# Patient Record
Sex: FEMALE | Race: WHITE | Employment: PART TIME | URBAN - NONMETROPOLITAN AREA
[De-identification: names, ages, dates, MRNs, and addresses within clinical notes are randomized per-mention and may not be internally consistent; named-entity substitution may affect disease eponyms.]

---

## 2017-11-20 ENCOUNTER — HOSPITAL ENCOUNTER (OUTPATIENT)
Dept: WOMENS IMAGING | Age: 52
Discharge: HOME | End: 2017-11-20

## 2017-11-20 DIAGNOSIS — Z12.31 VISIT FOR SCREENING MAMMOGRAM: ICD-10-CM

## 2017-11-20 PROCEDURE — G0202 SCR MAMMO BI INCL CAD: HCPCS

## 2018-11-26 ENCOUNTER — HOSPITAL ENCOUNTER (OUTPATIENT)
Dept: MAMMOGRAPHY | Age: 53
Discharge: HOME | End: 2018-11-26

## 2018-11-26 DIAGNOSIS — Z12.39 SCREENING BREAST EXAMINATION: ICD-10-CM

## 2018-11-26 PROCEDURE — 77063 BREAST TOMOSYNTHESIS BI: CPT

## 2019-08-22 ENCOUNTER — HOSPITAL ENCOUNTER (OUTPATIENT)
Dept: GENERAL RADIOLOGY | Age: 54
Discharge: HOME | End: 2019-08-22

## 2019-08-22 ENCOUNTER — HOSPITAL ENCOUNTER (OUTPATIENT)
Age: 54
Discharge: HOME | End: 2019-08-22

## 2019-08-22 DIAGNOSIS — S80.02XA CONTUSION OF LEFT KNEE, INITIAL ENCOUNTER: ICD-10-CM
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
## 2019-08-22 DIAGNOSIS — M17.32 UNILATERAL POST-TRAUMATIC OSTEOARTHRITIS, LEFT KNEE: ICD-10-CM
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
## 2019-08-22 PROCEDURE — 73560 X-RAY EXAM OF KNEE 1 OR 2: CPT

## 2019-12-04 ENCOUNTER — HOSPITAL ENCOUNTER (OUTPATIENT)
Dept: MAMMOGRAPHY | Age: 54
Discharge: HOME | End: 2019-12-04
Payer: COMMERCIAL

## 2019-12-04 DIAGNOSIS — Z12.39 BREAST SCREENING: ICD-10-CM

## 2019-12-04 PROCEDURE — 77063 BREAST TOMOSYNTHESIS BI: CPT

## 2020-08-31 ENCOUNTER — HOSPITAL ENCOUNTER (OUTPATIENT)
Dept: CT IMAGING | Age: 55
Discharge: HOME | End: 2020-08-31

## 2020-08-31 ENCOUNTER — HOSPITAL ENCOUNTER (OUTPATIENT)
Age: 55
Discharge: HOME | End: 2020-08-31

## 2020-08-31 LAB
CREATININE, WHOLE BLOOD: 0.6 MG/DL (ref 0.5–1.2)
ESTIMATED GFR, PCACC: > 90 ML/MIN/1.73M2

## 2020-08-31 PROCEDURE — 74177 CT ABD & PELVIS W/CONTRAST: CPT

## 2020-08-31 PROCEDURE — 6360000004 HC RX CONTRAST MEDICATION: Performed by: FAMILY MEDICINE

## 2020-08-31 PROCEDURE — 82565 ASSAY OF CREATININE: CPT

## 2020-08-31 RX ADMIN — IOPAMIDOL 100 ML: 755 INJECTION, SOLUTION INTRAVENOUS at 15:53

## 2020-08-31 RX ADMIN — IOHEXOL 50 ML: 240 INJECTION, SOLUTION INTRATHECAL; INTRAVASCULAR; INTRAVENOUS; ORAL at 14:45

## 2021-01-22 ENCOUNTER — HOSPITAL ENCOUNTER (OUTPATIENT)
Dept: MAMMOGRAPHY | Age: 56
Discharge: HOME | End: 2021-01-22
Payer: COMMERCIAL

## 2021-01-22 DIAGNOSIS — Z12.39 BREAST SCREENING: ICD-10-CM

## 2021-01-22 PROCEDURE — 77063 BREAST TOMOSYNTHESIS BI: CPT

## 2022-01-24 ENCOUNTER — HOSPITAL ENCOUNTER (OUTPATIENT)
Dept: MAMMOGRAPHY | Age: 57
Discharge: HOME | End: 2022-01-24
Payer: COMMERCIAL

## 2022-01-24 DIAGNOSIS — Z12.39 SCREENING BREAST EXAMINATION: ICD-10-CM

## 2022-01-24 PROCEDURE — 77063 BREAST TOMOSYNTHESIS BI: CPT

## 2023-01-24 ENCOUNTER — HOSPITAL ENCOUNTER (OUTPATIENT)
Dept: MAMMOGRAPHY | Age: 58
Discharge: HOME | End: 2023-01-24
Payer: COMMERCIAL

## 2023-01-24 DIAGNOSIS — Z12.39 SCREENING BREAST EXAMINATION: ICD-10-CM

## 2023-01-24 PROCEDURE — 77067 SCR MAMMO BI INCL CAD: CPT

## 2023-07-28 ENCOUNTER — HOSPITAL ENCOUNTER (OUTPATIENT)
Dept: MRI IMAGING | Age: 58
Discharge: HOME | End: 2023-07-28
Attending: RADIOLOGY

## 2023-07-28 DIAGNOSIS — Z00.6 EXAMINATION FOR NORMAL COMPARISON FOR CLINICAL RESEARCH: ICD-10-CM

## 2023-08-09 ENCOUNTER — HOSPITAL ENCOUNTER (OUTPATIENT)
Age: 58
Discharge: HOME | End: 2023-08-09
Payer: MEDICAID

## 2023-08-09 ENCOUNTER — HOSPITAL ENCOUNTER (OUTPATIENT)
Dept: GENERAL RADIOLOGY | Age: 58
Discharge: HOME | End: 2023-08-09
Payer: MEDICAID

## 2023-08-09 ENCOUNTER — OFFICE VISIT (OUTPATIENT)
Dept: NEUROLOGY | Age: 58
End: 2023-08-09
Payer: MEDICAID

## 2023-08-09 VITALS
HEIGHT: 63 IN | BODY MASS INDEX: 51.91 KG/M2 | DIASTOLIC BLOOD PRESSURE: 70 MMHG | SYSTOLIC BLOOD PRESSURE: 125 MMHG | HEART RATE: 71 BPM | OXYGEN SATURATION: 97 % | WEIGHT: 293 LBS

## 2023-08-09 DIAGNOSIS — G44.89 OTHER HEADACHE SYNDROME: ICD-10-CM

## 2023-08-09 DIAGNOSIS — G44.89 OTHER HEADACHE SYNDROME: Primary | ICD-10-CM

## 2023-08-09 PROCEDURE — 72050 X-RAY EXAM NECK SPINE 4/5VWS: CPT

## 2023-08-09 PROCEDURE — 99205 OFFICE O/P NEW HI 60 MIN: CPT | Performed by: PSYCHIATRY & NEUROLOGY

## 2023-08-09 RX ORDER — FLUOXETINE HYDROCHLORIDE 20 MG/1
20 CAPSULE ORAL DAILY
COMMUNITY

## 2023-08-09 RX ORDER — LAMOTRIGINE 25 MG/1
TABLET ORAL
Qty: 120 TABLET | Refills: 1 | Status: SHIPPED | OUTPATIENT
Start: 2023-08-09

## 2023-08-09 NOTE — PATIENT INSTRUCTIONS
Sed rate  CRP  Cervical spine x-ray  Start Lamictal 25 mg daily for 2 weeks then 50 mg daily for 2 weeks then 100 mg daily thereafter. Report any rash. Call if any questions. Referral to sleep medicine to screen for underlying sleep disorder  Keep headache diary  Call with any new symptoms or concerns.    Follow up in 6 weeks with Dr. Lizzie Carmona

## 2023-08-11 ENCOUNTER — OFFICE VISIT (OUTPATIENT)
Dept: PULMONOLOGY | Age: 58
End: 2023-08-11
Payer: MEDICAID

## 2023-08-11 VITALS
DIASTOLIC BLOOD PRESSURE: 74 MMHG | HEIGHT: 63 IN | BODY MASS INDEX: 51.91 KG/M2 | HEART RATE: 82 BPM | SYSTOLIC BLOOD PRESSURE: 126 MMHG | WEIGHT: 293 LBS | OXYGEN SATURATION: 96 %

## 2023-08-11 DIAGNOSIS — G44.89 HEADACHE SYNDROME: ICD-10-CM

## 2023-08-11 DIAGNOSIS — R53.82 CHRONIC FATIGUE: ICD-10-CM

## 2023-08-11 DIAGNOSIS — E66.01 MORBID OBESITY WITH BMI OF 60.0-69.9, ADULT (HCC): ICD-10-CM

## 2023-08-11 DIAGNOSIS — R29.818 SUSPECTED SLEEP APNEA: Primary | ICD-10-CM

## 2023-08-11 PROCEDURE — 99204 OFFICE O/P NEW MOD 45 MIN: CPT | Performed by: NURSE PRACTITIONER

## 2023-08-11 ASSESSMENT — ENCOUNTER SYMPTOMS
WHEEZING: 1
COUGH: 1
EYE ITCHING: 1
SINUS PAIN: 1
BACK PAIN: 1
SHORTNESS OF BREATH: 1
ABDOMINAL DISTENTION: 1
DIARRHEA: 1
SINUS PRESSURE: 1

## 2023-08-11 NOTE — PROGRESS NOTES
60.0-69.9, adult ([address])  ECHO Complete 2D W Doppler W Color    Baseline Diagnostic Sleep Study    Ferritin    Iron Binding Capacity    CBC with Auto Differential      4.  Chronic fatigue  Ferritin    Iron Binding Capacity    CBC with Auto Differential           Plan   Given symptoms,  will going proceed with a sleep study (HST vs PSG vs Split night)  Discussed the study at length with patient  Discussed treatment options if sleep study is positive  Encouraged to work on weight loss  Encouraged to get in 7 to 9 hours of sleep  Pulmonary hygiene discussed  Follow-up pending sleep study   ECHO  PSG and RTC 1 week to follow up    Electronically signed by LEISA Lopes CNP on 8/11/2023 at 11:27 AM

## 2023-08-15 ENCOUNTER — TELEPHONE (OUTPATIENT)
Dept: NEUROLOGY | Age: 58
End: 2023-08-15

## 2023-08-15 DIAGNOSIS — R53.82 CHRONIC FATIGUE: Primary | ICD-10-CM

## 2023-08-15 DIAGNOSIS — R79.0 LOW FERRITIN LEVEL: ICD-10-CM

## 2023-08-15 RX ORDER — FERROUS SULFATE 325(65) MG
325 TABLET ORAL 2 TIMES DAILY
Qty: 180 TABLET | Refills: 1 | Status: SHIPPED | OUTPATIENT
Start: 2023-08-15

## 2023-08-15 NOTE — TELEPHONE ENCOUNTER
Patient calling stating she completed Cervical spine X-ray, results in chart. She has scheduled appointment with chiropractor tomorrow and is asking if there is anything showing on the X-ray contraindicating chiropractor manipulation. Please advise. Thank you.

## 2023-08-15 NOTE — TELEPHONE ENCOUNTER
Cervical spine X ray shows: Mild uncinate spurring C4-5 and C5-C6 bilaterally. Moderate multilevel degenerative facet arthropathy bilaterally. Mild spondylosis scattered in the cervical spine. C7 is partially obscured by the patient's shoulders. No cervical fracture seen. Slight disc space narrowing C4-5 and C5-6. Prevertebral soft tissues unremarkable. Mild foraminal encroachment C3-4 right side by a prominent facet joint spur. I recommend against chiropractic neck manipulation. We can send her to PT as alternative.      Zion Quevedo MD

## 2023-08-16 ENCOUNTER — HOSPITAL ENCOUNTER (OUTPATIENT)
Dept: NON INVASIVE DIAGNOSTICS | Age: 58
Discharge: HOME | End: 2023-08-16
Payer: MEDICAID

## 2023-08-16 DIAGNOSIS — E66.01 MORBID OBESITY WITH BMI OF 60.0-69.9, ADULT (HCC): ICD-10-CM

## 2023-08-16 DIAGNOSIS — G44.89 HEADACHE SYNDROME: ICD-10-CM

## 2023-08-16 DIAGNOSIS — R29.818 SUSPECTED SLEEP APNEA: ICD-10-CM

## 2023-08-16 LAB
LV EF: 50 %
LVEF MODALITY: NORMAL

## 2023-08-16 PROCEDURE — 93306 TTE W/DOPPLER COMPLETE: CPT

## 2023-08-16 NOTE — PROGRESS NOTES
BRCA 2 Negative Mother     Dementia Mother     Heart Disease Father     Alcohol Abuse Father     Heart Attack Father     Cancer Sister     Breast Cancer Sister 55    BRCA 1 Negative Sister         negative    BRCA 2 Negative Sister     Cancer Sister     Breast Cancer Sister 62    BRCA 1 Negative Sister         negative    BRCA 2 Negative Sister     Cancer Sister     Ovarian Cancer Sister 62         I reviewed the past medical history, allergies, medications, social history and family history. Review of Systems   All systems reviewed, and are all negative, except what is mentioned in HPI      Vitals:    08/09/23 1355   BP: 125/70   Site: Left Upper Arm   Position: Sitting   Cuff Size: Large Adult   Pulse: 71   SpO2: 97%   Weight: (!) 346 lb (156.9 kg)   Height: 5' 3\" (1.6 m)       Physical Examination:  General appearance - alert, well appearing, and in no distress, oriented to person, place, and time and over weight  Mental status- Level of Alertness: awake  Orientation: person, place, time  Memory: normal  Fund of Knowledge: normal  Attention/Concentration: normal  Language: normal. Mood is normal.   Neck - supple, no significant adenopathy, carotids upstroke normal bilaterally. There is no axillary lymphadenopathy. There is no carotid bruit. No neck lymphadenopathy. No thyroid enlargement   Neurological -   Cranial Ibgals-OX-MMG:.   Cranial nerve II: Normal   Cranial nerve III: Pupils: equal, round, reactive to light  Cranial nerves III, IV, VI: Extraocular Movements: intact   Cranial nerve V: Facial sensation: intact   Cranial nerve VII:Facial strength: intact   Cranial nerve VIII: Hearing: intact    Cranial nerve IX: Palate Elevation intact bilaterally  Cranial nerve XI: Shoulder shrug intact bilaterally  Cranial nerve XII: Tongue midline   neck supple without rigidity, there is no limitation of range of motion of the neck.   DTR's are decreased distal and symmetric  Babinski sign negative  Motor exam

## 2023-08-17 DIAGNOSIS — E66.01 MORBID OBESITY WITH BMI OF 60.0-69.9, ADULT (HCC): Primary | ICD-10-CM

## 2023-08-17 DIAGNOSIS — R53.82 CHRONIC FATIGUE: ICD-10-CM

## 2023-08-17 DIAGNOSIS — R06.02 SOB (SHORTNESS OF BREATH): ICD-10-CM

## 2023-08-25 ENCOUNTER — TELEPHONE (OUTPATIENT)
Dept: NEUROLOGY | Age: 58
End: 2023-08-25

## 2023-08-25 NOTE — TELEPHONE ENCOUNTER
She can stop the Lamictal, see if the symptoms get better, update us next week on how she is doing and we can decide how to proceed from there, re challenge or change medications.  Sai Beltran MD

## 2023-08-25 NOTE — TELEPHONE ENCOUNTER
Patient called stating she is taking Lamictal 25 mg daily for the past 2 weeks. She is due to increase to 50 mg daily today. Patient is having chest pain and trouble breathing for the past 2 days. She is having achy bones. She feels her symptoms are related to the Lamictal. She feels the Lamictal is helping with her headaches. Please advise. Thank you.

## 2023-08-29 ENCOUNTER — OFFICE VISIT (OUTPATIENT)
Dept: CARDIOLOGY CLINIC | Age: 58
End: 2023-08-29
Payer: MEDICAID

## 2023-08-29 VITALS
SYSTOLIC BLOOD PRESSURE: 149 MMHG | HEIGHT: 63 IN | BODY MASS INDEX: 51.91 KG/M2 | WEIGHT: 293 LBS | DIASTOLIC BLOOD PRESSURE: 81 MMHG | HEART RATE: 71 BPM

## 2023-08-29 DIAGNOSIS — I27.20 PULMONARY HYPERTENSION (HCC): ICD-10-CM

## 2023-08-29 DIAGNOSIS — R06.02 SHORTNESS OF BREATH: Primary | ICD-10-CM

## 2023-08-29 PROCEDURE — 93000 ELECTROCARDIOGRAM COMPLETE: CPT | Performed by: INTERNAL MEDICINE

## 2023-08-29 PROCEDURE — 99204 OFFICE O/P NEW MOD 45 MIN: CPT | Performed by: INTERNAL MEDICINE

## 2023-08-29 RX ORDER — ATORVASTATIN CALCIUM 10 MG/1
10 TABLET, FILM COATED ORAL DAILY
COMMUNITY

## 2023-08-29 NOTE — PROGRESS NOTES
depression  The patient is asked to make an attempt to improve diet and exercise patterns to aid in medical management of this problem. Advised more plant based nutrition/meditarrean diet   Advised patient to call office or seek immediate medical attention if there is any new onset of  any chest pain, sob, palpitations, lightheadedness, dizziness, orthopnea, PND or pedal edema. All medication side effects were discussed in details. Thank youfor allowing me to participate in the care of this patient. Please do not hesitate to contact me for any further questions. Return if symptoms worsen or fail to improve, for Review testing, Regular follow up.        Electronically signed by Vijaya Espana MD [facility]  8/29/2023 at 9:57 AM EDT

## 2023-09-07 ENCOUNTER — TELEPHONE (OUTPATIENT)
Dept: CARDIOLOGY CLINIC | Age: 58
End: 2023-09-07

## 2023-09-07 NOTE — TELEPHONE ENCOUNTER
Pt LM on  stating she needed a referral for a dietician per Dr Grace Shields.      Pt's callback number is [phone]

## 2023-09-07 NOTE — TELEPHONE ENCOUNTER
Spoke with patient-advised that PCP would be able to better help since they have and treat all dx. Pt voiced understanding.

## 2023-10-08 ENCOUNTER — HOSPITAL ENCOUNTER (OUTPATIENT)
Dept: SLEEP CENTER | Age: 58
Discharge: HOME | End: 2023-10-10
Payer: MEDICAID

## 2023-10-08 DIAGNOSIS — R29.818 SUSPECTED SLEEP APNEA: ICD-10-CM

## 2023-10-08 DIAGNOSIS — G44.89 HEADACHE SYNDROME: ICD-10-CM

## 2023-10-08 DIAGNOSIS — E66.01 MORBID OBESITY WITH BMI OF 60.0-69.9, ADULT (HCC): ICD-10-CM

## 2023-10-08 PROCEDURE — 95810 POLYSOM 6/> YRS 4/> PARAM: CPT

## 2023-10-09 LAB — STATUS: NORMAL

## 2023-10-10 NOTE — PROGRESS NOTES
[place] [address]                               SLEEP STUDY REPORT    PATIENT NAME: Brea Castelan            :        1965  MED REC NO:   [id number]                           ROOM:  ACCOUNT NO:   [de-identified]                           ADMIT DATE: 10/08/2023  PROVIDER:     Vita Pereyra MD    DATE OF STUDY:  10/08/2023    REFERRING PROVIDER:  Daisha Jimenez CNP    The patient's height is 63 inches, weight is 347.6 pounds with a BMI of  61.6. HISTORY:  The patient is a 70-year-old pleasant female was initially  evaluated by Sil Reich CNP, on 2023. The patient had class  III Mallampati airway. The patient gave a history of snoring and  frequent nocturnal urination. The patient is scheduled for baseline  sleep study to evaluate for sleep apnea as an etiology for nocturnal  awakenings. METHODS:  The patient underwent digital polysomnography in compliance  with the standards and specifications from the AASM Manual including the  simultaneous recording of 3 EEG channels (F4-M1, C4-M1, and O2-M1 with  back up electrodes F3-M2, C3-M2, and O1-M2), 2 EOG channels (E1-M2, and  E2-M1,), EMG (chin, left & right leg), EKG, Nonin pulse oximetry with   less than 2 second averaging time, body position, airflow recorded by  oral-nasal thermal sensor and nasal air pressure transducer, plus  respiratory effort recorded by calibrated respiratory inductance  plethysmography (RIP), flow volume loop, sound and video. Sleep staging  and scoring followed the standard put forth by the American Academy of  Sleep Medicine and utilized the 1B obstructive hypopnea event  desaturation of 4 percent or greater. INTERPRETATION:  This is a baseline sleep study and the study was  performed on 10/08/2023.   The study was started at 10:17 p.m. and was  terminated at 04:57 a.m. with a total recording time of 400

## 2023-10-16 ENCOUNTER — OFFICE VISIT (OUTPATIENT)
Dept: PULMONOLOGY | Age: 58
End: 2023-10-16
Payer: MEDICAID

## 2023-10-16 VITALS
HEIGHT: 63 IN | OXYGEN SATURATION: 94 % | HEART RATE: 77 BPM | SYSTOLIC BLOOD PRESSURE: 132 MMHG | BODY MASS INDEX: 51.91 KG/M2 | WEIGHT: 293 LBS | TEMPERATURE: 98.7 F | DIASTOLIC BLOOD PRESSURE: 62 MMHG

## 2023-10-16 DIAGNOSIS — G47.33 OSA (OBSTRUCTIVE SLEEP APNEA): Primary | ICD-10-CM

## 2023-10-16 DIAGNOSIS — R29.818 SUSPECTED SLEEP APNEA: ICD-10-CM

## 2023-10-16 DIAGNOSIS — I27.20 PULMONARY HYPERTENSION (HCC): ICD-10-CM

## 2023-10-16 DIAGNOSIS — G44.89 HEADACHE SYNDROME: ICD-10-CM

## 2023-10-16 DIAGNOSIS — E66.01 MORBID OBESITY WITH BMI OF 50.0-59.9, ADULT (HCC): ICD-10-CM

## 2023-10-16 DIAGNOSIS — R53.82 CHRONIC FATIGUE: ICD-10-CM

## 2023-10-16 PROCEDURE — 99214 OFFICE O/P EST MOD 30 MIN: CPT | Performed by: NURSE PRACTITIONER

## 2023-10-16 RX ORDER — FAMOTIDINE 20 MG/1
20 TABLET, FILM COATED ORAL DAILY
COMMUNITY
Start: 2023-09-18

## 2023-10-16 RX ORDER — BUSPIRONE HYDROCHLORIDE 5 MG/1
5 TABLET ORAL 2 TIMES DAILY
COMMUNITY
Start: 2023-10-05

## 2023-10-16 RX ORDER — PIOGLITAZONEHYDROCHLORIDE 45 MG/1
45 TABLET ORAL DAILY
COMMUNITY

## 2023-10-16 RX ORDER — ESCITALOPRAM OXALATE 10 MG/1
TABLET ORAL
COMMUNITY
Start: 2023-10-13

## 2023-10-16 RX ORDER — LAMOTRIGINE 25 MG/1
100 TABLET ORAL DAILY
COMMUNITY
End: 2023-10-20

## 2023-10-16 ASSESSMENT — ENCOUNTER SYMPTOMS
EYES NEGATIVE: 1
ALLERGIC/IMMUNOLOGIC NEGATIVE: 1
WHEEZING: 0
SHORTNESS OF BREATH: 1
GASTROINTESTINAL NEGATIVE: 1
COUGH: 0

## 2023-10-19 ENCOUNTER — HOSPITAL ENCOUNTER (OUTPATIENT)
Age: 58
Discharge: HOME | End: 2023-10-19
Payer: MEDICAID

## 2023-10-19 DIAGNOSIS — G44.89 OTHER HEADACHE SYNDROME: ICD-10-CM

## 2023-10-19 LAB
CRP SERPL-MCNC: 0.31 MG/DL (ref 0–1)
ERYTHROCYTE [SEDIMENTATION RATE] IN BLOOD BY WESTERGREN METHOD: 23 MM/HR (ref 0–20)

## 2023-10-19 PROCEDURE — 36415 COLL VENOUS BLD VENIPUNCTURE: CPT

## 2023-10-19 PROCEDURE — 86140 C-REACTIVE PROTEIN: CPT

## 2023-10-19 PROCEDURE — 85651 RBC SED RATE NONAUTOMATED: CPT

## 2023-10-20 ENCOUNTER — OFFICE VISIT (OUTPATIENT)
Dept: NEUROLOGY | Age: 58
End: 2023-10-20
Payer: MEDICAID

## 2023-10-20 VITALS
BODY MASS INDEX: 60.23 KG/M2 | HEART RATE: 88 BPM | WEIGHT: 293 LBS | OXYGEN SATURATION: 95 % | SYSTOLIC BLOOD PRESSURE: 118 MMHG | DIASTOLIC BLOOD PRESSURE: 62 MMHG

## 2023-10-20 DIAGNOSIS — G44.89 OTHER HEADACHE SYNDROME: Primary | ICD-10-CM

## 2023-10-20 PROCEDURE — 99213 OFFICE O/P EST LOW 20 MIN: CPT | Performed by: PSYCHIATRY & NEUROLOGY

## 2023-10-20 RX ORDER — LAMOTRIGINE 100 MG/1
100 TABLET ORAL DAILY
Qty: 30 TABLET | Refills: 5 | Status: SHIPPED | OUTPATIENT
Start: 2023-10-20

## 2023-10-20 NOTE — PATIENT INSTRUCTIONS
Continue Lamictal 100 mg daily. Follow sleep medicine recommendations. Keep headache diary  Call with any new symptoms or concerns. Follow up in 4 months.

## 2023-10-20 NOTE — PROGRESS NOTES
NEUROLOGY OUT PATIENT FOLLOW UP NOTE:  10/20/49853:46 PM    Stephenie Felix is here for follow up for headache, she is here to go over plan. Allergies   Allergen Reactions    Tape Jackqueline Garbe Tape] Dermatitis       Current Outpatient Medications:     famotidine (PEPCID) 20 MG tablet, Take 1 tablet by mouth daily, Disp: , Rfl:     busPIRone (BUSPAR) 5 MG tablet, Take 1 tablet by mouth 2 times daily, Disp: , Rfl:     pioglitazone (ACTOS) 45 MG tablet, Take 1 tablet by mouth daily, Disp: , Rfl:     lamoTRIgine (LAMICTAL) 25 MG tablet, Take 4 tablets by mouth daily, Disp: , Rfl:     atorvastatin (LIPITOR) 10 MG tablet, Take 1 tablet by mouth daily, Disp: , Rfl:     ferrous sulfate (IRON 325) 325 (65 Fe) MG tablet, Take 1 tablet by mouth 2 times daily (Patient taking differently: Take 1 tablet by mouth 2 times daily Patient states that she is only taking 1 a day), Disp: 180 tablet, Rfl: 1    FLUoxetine (PROZAC) 20 MG capsule, Take 1 capsule by mouth daily, Disp: , Rfl:     Meclizine HCl (ANTIVERT PO), Take by mouth, Disp: , Rfl:     enalapril (VASOTEC) 5 MG tablet, Take 1 tablet by mouth daily, Disp: , Rfl:     escitalopram (LEXAPRO) 10 MG tablet, , Disp: , Rfl:     I reviewed the past medical history, allergies, medications, social history and family history. PE:   Vitals:    10/20/23 1336   BP: 118/62   Site: Left Upper Arm   Position: Sitting   Cuff Size: Large Adult   Pulse: 88   SpO2: 95%   Weight: (!) 154.2 kg (340 lb)     General Appearance:  alert and cooperative  Gen: NAD, Language is Intact. Skin: no rash, lesion, moist to touch. warm  Head: no rash, no icterus  Neck:  The Neck is supple. Neuro: CN 2-12 grossly intact with no focal deficits. Power 5/5 Throughout symmetric, Reflexes are  symmetric. Long tracts are intact. Cerebellar exam is Intact. Sensory exam is intact to light touch. Gait is intact.   Musculoskeletal:  Has no hand arthritis, no limitation of ROM in any of the four

## 2023-10-26 ENCOUNTER — OFFICE VISIT (OUTPATIENT)
Dept: PULMONOLOGY | Age: 58
End: 2023-10-26
Payer: MEDICAID

## 2023-10-26 VITALS
HEIGHT: 63 IN | OXYGEN SATURATION: 96 % | HEART RATE: 75 BPM | WEIGHT: 293 LBS | TEMPERATURE: 97.6 F | SYSTOLIC BLOOD PRESSURE: 138 MMHG | BODY MASS INDEX: 51.91 KG/M2 | DIASTOLIC BLOOD PRESSURE: 68 MMHG

## 2023-10-26 DIAGNOSIS — G44.89 HEADACHE SYNDROME: ICD-10-CM

## 2023-10-26 DIAGNOSIS — R53.82 CHRONIC FATIGUE: ICD-10-CM

## 2023-10-26 DIAGNOSIS — G47.33 OSA (OBSTRUCTIVE SLEEP APNEA): Primary | ICD-10-CM

## 2023-10-26 DIAGNOSIS — E66.01 MORBID OBESITY WITH BMI OF 50.0-59.9, ADULT (HCC): ICD-10-CM

## 2023-10-26 DIAGNOSIS — I27.20 PULMONARY HYPERTENSION (HCC): ICD-10-CM

## 2023-10-26 PROCEDURE — 99214 OFFICE O/P EST MOD 30 MIN: CPT | Performed by: NURSE PRACTITIONER

## 2023-10-26 RX ORDER — NYSTATIN 100000 U/G
CREAM TOPICAL
COMMUNITY
Start: 2023-07-28

## 2023-10-26 RX ORDER — NAPROXEN 250 MG/1
500 TABLET ORAL EVERY 8 HOURS PRN
COMMUNITY

## 2023-10-26 RX ORDER — POLYETHYLENE GLYCOL-3350 AND ELECTROLYTES 236; 6.74; 5.86; 2.97; 22.74 G/274.31G; G/274.31G; G/274.31G; G/274.31G; G/274.31G
POWDER, FOR SOLUTION ORAL
COMMUNITY
Start: 2023-09-14

## 2023-10-26 RX ORDER — NYSTATIN 100000 U/G
1 OINTMENT TOPICAL 3 TIMES DAILY PRN
COMMUNITY

## 2023-10-26 RX ORDER — PANTOPRAZOLE SODIUM 40 MG/1
40 TABLET, DELAYED RELEASE ORAL 2 TIMES DAILY
COMMUNITY
Start: 2023-10-06

## 2023-10-26 ASSESSMENT — ENCOUNTER SYMPTOMS
GASTROINTESTINAL NEGATIVE: 1
WHEEZING: 0
SHORTNESS OF BREATH: 1
ALLERGIC/IMMUNOLOGIC NEGATIVE: 1
EYES NEGATIVE: 1
COUGH: 0

## 2023-10-26 NOTE — PROGRESS NOTES
[facility]      [place] Ards         [id number]  10/26/2023   Chief Complaint   Patient presents with    Follow-up     Pastor coming in to discuss treatment. Pt of Zoe Pedroza CNP    PAP Download:   Original or initial AHI: 40.7     Date of initial study: 10/08/2023      Neck Size: 17  Mallampati 4  ESS:  9  SAQLI: 27    Here is a scan of the most recent download:  NOT ON PASTOR TREATMENT    Presentation:   Annelise King presents for sleep medicine follow up for obstructive sleep apnea  Since the last visit, Annelise King has been to my office she has discussed her sleep testing results with her other providers. She now would like to try and treat her severe PASTOR. Patient still with same sleep issues- chronic fatigue and headache syndrome being followed with Dr. Zulma Lombardi.   MO BMI 61  Briefly discussed [facility] and told patient she would have to lose weight for this to be an options for her. Multiple psych medications  No specific sleep medication use at night   Called patients psych provider after last visit- patient is in a better more positive mood today in the office     Equipment issues:  -NA    Sleep issues:  Do you feel better? No  More rested? No   Better concentration? no    Progress History:   Since last visit any new medical issues? No  New ER or hospital visits? No  Any new or changes in medicines? No  Any new sleep medicines? No    DATE OF STUDY: 10/08/2023     SLEEP STUDY REPORT     HISTORY:  The patient is a 78-year-old pleasant female was initially  evaluated by Zoe Pedroza CNP, on 08/11/2023. The patient had class  III Mallampati airway. The patient gave a history of snoring and  frequent nocturnal urination. The patient is scheduled for baseline  sleep study to evaluate for sleep apnea as an etiology for nocturnal  awakenings.      METHODS:  The patient underwent digital polysomnography in compliance  with the standards and specifications from

## 2023-11-06 ENCOUNTER — TELEPHONE (OUTPATIENT)
Dept: PULMONOLOGY | Age: 58
End: 2023-11-06

## 2023-11-06 NOTE — TELEPHONE ENCOUNTER
Oriana left a voicemail on 11/6/23 at 10:12 am that she is requesting a callback to discuss sleep study. I called and patient is asking if she can sleep on her stomach or in a chair during her titration. Called sleep lab and Elsie Mendiola stated she would be able to sleep in a chair. Advised patient.

## 2023-12-10 ENCOUNTER — HOSPITAL ENCOUNTER (OUTPATIENT)
Dept: SLEEP CENTER | Age: 58
Discharge: HOME | End: 2023-12-12
Payer: MEDICAID

## 2023-12-10 DIAGNOSIS — G47.33 OSA (OBSTRUCTIVE SLEEP APNEA): ICD-10-CM

## 2023-12-10 PROCEDURE — 95811 POLYSOM 6/>YRS CPAP 4/> PARM: CPT

## 2023-12-11 LAB — STATUS: NORMAL

## 2023-12-11 NOTE — PROGRESS NOTES
Title:  CPAP/BiLevel Titration's    Approved by:  Emy Ramirez MD      Approval Date:  December, 2021 Next Review:  December, 2023     Responsible Party: [employer] Institution/Entities Applies to:   [address] [place] [address] Number:  None    Document Type:  Such as Guideline, Policy, Policy & Procedure, or Procedure, Instructions  Manual:  Policy and Procedures    Section: IV Policy Start Date: October, 0934         POLICY: Positive airway pressure device is used to treat patients with sleep related breathing disorders characterized by full or partial occlusion of the upper airway during sleep. A patient must have undergone polysomnography and diagnosed with obstructive sleep apnea. All individuals who record sleep studies must follow best practices for CPAP/bilevel/ASV titrations in order to attain the ideal pressure setting for their patients. Too low of pressure may cause patients to either be sub-optimally treated or to wake up in a panic. Too much pressure may cause the patient to experience bloating or mask leakage. Determining the appropriate pressure setting for each patient will lead to improved adherence and outcome. Titrations are not an exact science, and it is understood that technologists may need to make minor changes for individual patients. The procedures outlined below are meant to be a guideline and follow the spirit of the AASM clinical guidelines. PROCEDURE:    CPAP:    Review the patients pertinent medical al history, previous sleep study or studies to ass the severity of sleep disordered breathing. Review of pertinent information will help to attain a better titration.    Applications of electrodes, montages, filters, sensitivities and scoring will be performed according to the current version of the AASM Scoring Manual.   Prior to initiating study collect all appropriate PAP supplies  Tubing   Humidifier (filled with distilled

## 2023-12-12 NOTE — PROGRESS NOTES
[place] [address]                               SLEEP STUDY REPORT    PATIENT NAME: Kwame Avalos            :        1965  MED REC NO:   [id number]                           ROOM:  ACCOUNT NO:   [de-identified]                           ADMIT DATE: 12/10/2023  PROVIDER:     Salvador Romeo. MD Hafsa    DATE OF STUDY:  12/10/2023    CPAP TITRATION STUDY REPORT    REFERRING PROVIDER:  Vijay Talbert CNP    The patient's height is 63 inches, weight is 335 pounds with a BMI of  59.3. HISTORY:  The patient is a 77-year-old female who underwent initial  baseline sleep study 10/08/2023. The patient was diagnosed with severe  obstructive sleep apnea. The patient also noted to have nocturnal  hypoxia and chronic fatigue. The patient is scheduled for CPAP  titration as a treatment. METHODS:  The patient underwent digital polysomnography in compliance  with the standards and specifications from the AASM Manual including the  simultaneous recording of 3 EEG channels (F4-M1, C4-M1, and O2-M1 with  back up electrodes F3-M2, C3-M2, and O1-M2), 2 EOG channels (E1-M2, and  E2-M1,), EMG (chin, left & right leg), EKG, Nonin pulse oximetry with   less than 2 second averaging time, body position, airflow recorded by  oral-nasal thermal sensor and nasal air pressure transducer, plus  respiratory effort recorded by calibrated respiratory inductance  plethysmography (RIP), flow volume loop, sound and video. Sleep staging  and scoring followed the standard put forth by the American Academy of  Sleep Medicine and utilized the 4A obstructive hypopnea event  desaturation of 4 percent or greater. INTERPRETATION:  This is a CPAP titration study and the study was  performed on 12/10/2023.   The study was started at 10:06 p.m. and was  terminated at 05:00 a.m. with a total recording time of 413.3 minutes  and sleep period time was 410.7

## 2023-12-13 ENCOUNTER — TELEPHONE (OUTPATIENT)
Dept: SLEEP CENTER | Age: 58
End: 2023-12-13

## 2023-12-13 DIAGNOSIS — G47.33 OSA (OBSTRUCTIVE SLEEP APNEA): Primary | ICD-10-CM

## 2023-12-13 NOTE — TELEPHONE ENCOUNTER
Yodit Orourke. Per Dr. Consuelo Kc dictation, will you please place an order for CPAP?       Thanks so much,  Guevara Chowdhury

## 2023-12-14 ENCOUNTER — TELEPHONE (OUTPATIENT)
Dept: SLEEP CENTER | Age: 58
End: 2023-12-14

## 2024-01-25 ENCOUNTER — HOSPITAL ENCOUNTER (OUTPATIENT)
Dept: MAMMOGRAPHY | Age: 59
Discharge: HOME | End: 2024-01-25
Payer: MEDICAID

## 2024-01-25 DIAGNOSIS — Z12.39 BREAST SCREENING: ICD-10-CM

## 2024-01-25 PROCEDURE — 77063 BREAST TOMOSYNTHESIS BI: CPT

## 2024-02-23 ENCOUNTER — OFFICE VISIT (OUTPATIENT)
Dept: NEUROLOGY | Age: 59
End: 2024-02-23
Payer: MEDICAID

## 2024-02-23 VITALS
WEIGHT: 293 LBS | DIASTOLIC BLOOD PRESSURE: 70 MMHG | HEART RATE: 69 BPM | BODY MASS INDEX: 51.91 KG/M2 | SYSTOLIC BLOOD PRESSURE: 124 MMHG | OXYGEN SATURATION: 96 % | HEIGHT: 63 IN

## 2024-02-23 DIAGNOSIS — G25.2 RESTING TREMOR: ICD-10-CM

## 2024-02-23 DIAGNOSIS — G44.89 OTHER HEADACHE SYNDROME: Primary | ICD-10-CM

## 2024-02-23 PROCEDURE — 99214 OFFICE O/P EST MOD 30 MIN: CPT | Performed by: NURSE PRACTITIONER

## 2024-02-23 RX ORDER — DILTIAZEM HYDROCHLORIDE 60 MG/1
2 TABLET, FILM COATED ORAL 2 TIMES DAILY
COMMUNITY
Start: 2024-02-14

## 2024-02-23 RX ORDER — LAMOTRIGINE 100 MG/1
100 TABLET ORAL DAILY
Qty: 30 TABLET | Refills: 5 | Status: SHIPPED | OUTPATIENT
Start: 2024-02-23

## 2024-02-23 RX ORDER — ALBUTEROL SULFATE 90 UG/1
2 AEROSOL, METERED RESPIRATORY (INHALATION) EVERY 6 HOURS PRN
COMMUNITY
Start: 2023-12-27

## 2024-02-23 NOTE — PROGRESS NOTES
NEUROLOGY OUT PATIENT FOLLOW UP NOTE:  2/23/202412:58 PM    Mami Lobato is here for follow up for headache.            Allergies   Allergen Reactions    Tape [Adhesive Tape] Dermatitis       Current Outpatient Medications:     albuterol sulfate HFA (PROVENTIL;VENTOLIN;PROAIR) 108 (90 Base) MCG/ACT inhaler, 2 puffs every 6 hours as needed for Wheezing, Disp: , Rfl:     SYMBICORT 80-4.5 MCG/ACT AERO, Inhale 2 puffs into the lungs in the morning and 2 puffs in the evening., Disp: , Rfl:     GAVILYTE-G 236 g solution, USE AS DIRECTED FOR COLONOSCOPY PREP, Disp: , Rfl:     pantoprazole (PROTONIX) 40 MG tablet, Take 1 tablet by mouth 2 times daily, Disp: , Rfl:     nystatin (MYCOSTATIN) 952845 UNIT/GM cream, APPLY TOPICALLY IN GROIN AND ABDOMINAL FOLDS TWICE DAILY FOR 7 DAYS, Disp: , Rfl:     nystatin (MYCOSTATIN) 757364 UNIT/GM ointment, Apply 1 Application topically 3 times daily as needed, Disp: , Rfl:     naproxen (NAPROSYN) 250 MG tablet, Take 2 tablets by mouth every 8 hours as needed, Disp: , Rfl:     lamoTRIgine (LAMICTAL) 100 MG tablet, Take 1 tablet by mouth daily, Disp: 30 tablet, Rfl: 5    busPIRone (BUSPAR) 5 MG tablet, Take 2 tablets by mouth 2 times daily, Disp: , Rfl:     pioglitazone (ACTOS) 45 MG tablet, Take 1 tablet by mouth daily, Disp: , Rfl:     escitalopram (LEXAPRO) 10 MG tablet, 1 tablet, Disp: , Rfl:     atorvastatin (LIPITOR) 10 MG tablet, Take 1 tablet by mouth daily, Disp: , Rfl:     ferrous sulfate (IRON 325) 325 (65 Fe) MG tablet, Take 1 tablet by mouth 2 times daily (Patient taking differently: Take 1 tablet by mouth 2 times daily Patient states that she is only taking 1 a day), Disp: 180 tablet, Rfl: 1    Meclizine HCl (ANTIVERT PO), Take by mouth, Disp: , Rfl:     enalapril (VASOTEC) 5 MG tablet, Take 1 tablet by mouth daily, Disp: , Rfl:     I reviewed the past medical history, allergies, medications, social history and family history.       PE:   Vitals:    02/23/24 1249

## 2024-02-23 NOTE — PATIENT INSTRUCTIONS
Continue Lamictal 100 mg daily.  Refills given  Follow sleep medicine recommendations.   Keep headache diary  Lease measure your blood pressure and pulse during spells.   Call with any new symptoms or concerns.   Follow up in 4 months.    Call if any questions or concern

## 2024-03-17 NOTE — PROGRESS NOTES
[facility]      Lexi Diehl         [id number]  10/25/2023   No chief complaint on file. Pt of Quinten Kinney CNP    PAP Download:   Original or initial AHI: 40.7     Date of initial study: 10/08/2023      Neck Size: ***  Mallampati ***  ESS:  ***  SAQLI: ***    Here is a scan of the most recent download:  NOT ON MICAELA TREATMENT    Presentation:   Orlando Samano presents for sleep medicine follow up for {Sleep apnea diagnosis:18433}  Since the last visit, Orlando Samano ***    Equipment issues: The pressure {IS/IS NOT:28475}  acceptable, the mask is {Desc; acceptable/unacceptable:93729}     Sleep issues:  Do you feel better? {YES / ZU:19863}  More rested? {YES, NO, SOMETIMES:2100500545}   Better concentration? {YES/NO:20843}    Progress History:   Since last visit any new medical issues? {EXAM; WQL/FX:13538}  New ER or hospital visits? {EXAM; YES/NO:19492}  Any new or changes in medicines? {EXAM;YES/NO:19492}  Any new sleep medicines? {EXAM; YES/NO:19492}    Review of Systems -   Review of Systems     Physical Exam:    BMI:  There is no height or weight on file to calculate BMI. Wt Readings from Last 3 Encounters:   10/20/23 (!) 154.2 kg (340 lb)   10/16/23 (!) 154.8 kg (341 lb 3.2 oz)   08/29/23 (!) 152.8 kg (336 lb 12.8 oz)     Weight {WEIGHT LOSS/GAIN 2:19682}  Vitals: There were no vitals taken for this visit. Physical Exam      ASSESSMENT/DIAGNOSIS    {No diagnosis found. (Refresh or delete this SmartLink)}         Plan   Do you need any equipment today?  {EXAM; PAWEL/ZN:60132} non smoker  no IVDA  no ETOH abuse   lives alone

## 2024-06-28 ENCOUNTER — OFFICE VISIT (OUTPATIENT)
Dept: NEUROLOGY | Age: 59
End: 2024-06-28
Payer: MEDICAID

## 2024-06-28 VITALS
HEART RATE: 82 BPM | WEIGHT: 293 LBS | SYSTOLIC BLOOD PRESSURE: 121 MMHG | DIASTOLIC BLOOD PRESSURE: 68 MMHG | HEIGHT: 63 IN | BODY MASS INDEX: 51.91 KG/M2 | OXYGEN SATURATION: 97 %

## 2024-06-28 DIAGNOSIS — G44.89 OTHER HEADACHE SYNDROME: Primary | ICD-10-CM

## 2024-06-28 PROCEDURE — 99213 OFFICE O/P EST LOW 20 MIN: CPT | Performed by: PSYCHIATRY & NEUROLOGY

## 2024-06-28 RX ORDER — LAMOTRIGINE 150 MG/1
150 TABLET ORAL DAILY
Qty: 30 TABLET | Refills: 5 | Status: SHIPPED | OUTPATIENT
Start: 2024-06-28

## 2024-06-28 NOTE — PATIENT INSTRUCTIONS
Change Lamictal 150 mg daily.  Refills given  Please use the CPAP.    Call with any new symptoms or concerns.   Follow up in 4 months.    Call if any questions or concern

## 2024-06-28 NOTE — PROGRESS NOTES
NEUROLOGY OUT PATIENT FOLLOW UP NOTE:  6/28/202410:13 AM    Mami Lobato is here for follow up for headache.  She is here to go over plan.        Allergies   Allergen Reactions    Tape [Adhesive Tape] Dermatitis       Current Outpatient Medications:     albuterol sulfate HFA (PROVENTIL;VENTOLIN;PROAIR) 108 (90 Base) MCG/ACT inhaler, 2 puffs every 6 hours as needed for Wheezing, Disp: , Rfl:     SYMBICORT 80-4.5 MCG/ACT AERO, Inhale 2 puffs into the lungs in the morning and 2 puffs in the evening., Disp: , Rfl:     lamoTRIgine (LAMICTAL) 100 MG tablet, Take 1 tablet by mouth daily, Disp: 30 tablet, Rfl: 5    GAVILYTE-G 236 g solution, USE AS DIRECTED FOR COLONOSCOPY PREP, Disp: , Rfl:     pantoprazole (PROTONIX) 40 MG tablet, Take 1 tablet by mouth 2 times daily, Disp: , Rfl:     nystatin (MYCOSTATIN) 813622 UNIT/GM cream, APPLY TOPICALLY IN GROIN AND ABDOMINAL FOLDS TWICE DAILY FOR 7 DAYS, Disp: , Rfl:     nystatin (MYCOSTATIN) 067457 UNIT/GM ointment, Apply 1 Application topically 3 times daily as needed, Disp: , Rfl:     naproxen (NAPROSYN) 250 MG tablet, Take 2 tablets by mouth every 8 hours as needed, Disp: , Rfl:     busPIRone (BUSPAR) 5 MG tablet, Take 3 tablets by mouth 2 times daily, Disp: , Rfl:     pioglitazone (ACTOS) 45 MG tablet, Take 1 tablet by mouth daily, Disp: , Rfl:     escitalopram (LEXAPRO) 10 MG tablet, 1 tablet, Disp: , Rfl:     atorvastatin (LIPITOR) 10 MG tablet, Take 1 tablet by mouth daily, Disp: , Rfl:     Meclizine HCl (ANTIVERT PO), Take by mouth, Disp: , Rfl:     enalapril (VASOTEC) 5 MG tablet, Take 1 tablet by mouth daily, Disp: , Rfl:     I reviewed the past medical history, allergies, medications, social history and family history.       PE:   Vitals:    06/28/24 0954   BP: 121/68   Site: Left Upper Arm   Position: Sitting   Cuff Size: Large Adult   Pulse: 82   SpO2: 97%   Weight: (!) 137.4 kg (303 lb)   Height: 1.6 m (5' 3\")     General Appearance:  alert and

## 2024-10-14 ENCOUNTER — OFFICE VISIT (OUTPATIENT)
Dept: NEUROLOGY | Age: 59
End: 2024-10-14
Payer: MEDICAID

## 2024-10-14 VITALS
WEIGHT: 287 LBS | OXYGEN SATURATION: 97 % | HEART RATE: 75 BPM | BODY MASS INDEX: 50.85 KG/M2 | HEIGHT: 63 IN | DIASTOLIC BLOOD PRESSURE: 60 MMHG | SYSTOLIC BLOOD PRESSURE: 116 MMHG

## 2024-10-14 DIAGNOSIS — G44.89 OTHER HEADACHE SYNDROME: Primary | ICD-10-CM

## 2024-10-14 PROCEDURE — 99214 OFFICE O/P EST MOD 30 MIN: CPT | Performed by: NURSE PRACTITIONER

## 2024-10-14 RX ORDER — LIRAGLUTIDE 6 MG/ML
INJECTION SUBCUTANEOUS
COMMUNITY
Start: 2024-09-17

## 2024-10-14 RX ORDER — TOPIRAMATE 50 MG/1
TABLET, FILM COATED ORAL
Qty: 30 TABLET | Refills: 3 | Status: SHIPPED | OUTPATIENT
Start: 2024-10-14

## 2024-10-14 NOTE — PROGRESS NOTES
NEUROLOGY OUT PATIENT FOLLOW UP NOTE:  10/14/571862:09 AM    Mami Lobato is here for follow up for  headache.            Allergies   Allergen Reactions    Tape [Adhesive Tape] Dermatitis       Current Outpatient Medications:     VICTOZA 18 MG/3ML SOPN SC injection, INJECT 0.6MG UNDER THE SKIN ONCE DAILY FOR 1 WEEK THEN INCREASE TO 1.2MG UNDER THE SKIN ONCE DAILY, Disp: , Rfl:     lamoTRIgine (LAMICTAL) 150 MG tablet, Take 1 tablet by mouth daily, Disp: 30 tablet, Rfl: 5    albuterol sulfate HFA (PROVENTIL;VENTOLIN;PROAIR) 108 (90 Base) MCG/ACT inhaler, 2 puffs every 6 hours as needed for Wheezing, Disp: , Rfl:     SYMBICORT 80-4.5 MCG/ACT AERO, Inhale 2 puffs into the lungs in the morning and 2 puffs in the evening., Disp: , Rfl:     GAVILYTE-G 236 g solution, USE AS DIRECTED FOR COLONOSCOPY PREP, Disp: , Rfl:     pantoprazole (PROTONIX) 40 MG tablet, Take 1 tablet by mouth 2 times daily, Disp: , Rfl:     nystatin (MYCOSTATIN) 944961 UNIT/GM cream, APPLY TOPICALLY IN GROIN AND ABDOMINAL FOLDS TWICE DAILY FOR 7 DAYS, Disp: , Rfl:     nystatin (MYCOSTATIN) 896396 UNIT/GM ointment, Apply 1 Application topically 3 times daily as needed, Disp: , Rfl:     naproxen (NAPROSYN) 250 MG tablet, Take 2 tablets by mouth every 8 hours as needed, Disp: , Rfl:     busPIRone (BUSPAR) 5 MG tablet, Take 3 tablets by mouth 2 times daily, Disp: , Rfl:     pioglitazone (ACTOS) 45 MG tablet, Take 1 tablet by mouth daily, Disp: , Rfl:     escitalopram (LEXAPRO) 10 MG tablet, 1 tablet, Disp: , Rfl:     atorvastatin (LIPITOR) 10 MG tablet, Take 1 tablet by mouth daily, Disp: , Rfl:     Meclizine HCl (ANTIVERT PO), Take by mouth, Disp: , Rfl:     enalapril (VASOTEC) 5 MG tablet, Take 1 tablet by mouth daily, Disp: , Rfl:     I reviewed the past medical history, allergies, medications, social history and family history.       PE:   Vitals:    10/14/24 1055   BP: 116/60   Site: Left Upper Arm   Position: Sitting   Cuff Size: Large

## 2024-10-14 NOTE — PATIENT INSTRUCTIONS
Topamax 25 mg tablet daily for one week then 50 mg daily there after. Take with plenty of fluids.   Continue with Lamictal 150 mg daily.  Refills given  Please call us in 2 weeks with an update as to how you are doing.  Please use the CPAP.    Call with any new symptoms or concerns.   Follow up in 4 months.    Call if any questions or concern

## 2025-01-24 ENCOUNTER — HOSPITAL ENCOUNTER (OUTPATIENT)
Dept: MAMMOGRAPHY | Age: 60
Discharge: HOME | End: 2025-01-24
Payer: MEDICAID

## 2025-01-24 DIAGNOSIS — Z12.39 BREAST SCREENING: ICD-10-CM

## 2025-01-24 PROCEDURE — 77063 BREAST TOMOSYNTHESIS BI: CPT

## 2025-02-12 DIAGNOSIS — G44.89 OTHER HEADACHE SYNDROME: ICD-10-CM

## 2025-02-12 NOTE — TELEPHONE ENCOUNTER
Mami Lobato called requesting a refill on the following medications:  Requested Prescriptions     Pending Prescriptions Disp Refills    lamoTRIgine (LAMICTAL) 150 MG tablet 30 tablet 5     Sig: Take 1 tablet by mouth daily       Date of last visit: 10/14/2024  Date of next visit (if applicable):2/21/2025  Date of last refill: 5/25/63  Pharmacy Name: Patti Nieves LPN

## 2025-02-13 RX ORDER — LAMOTRIGINE 150 MG/1
150 TABLET ORAL DAILY
Qty: 30 TABLET | Refills: 5 | Status: SHIPPED | OUTPATIENT
Start: 2025-02-13

## 2025-02-21 ENCOUNTER — LAB (OUTPATIENT)
Dept: LAB | Age: 60
End: 2025-02-21

## 2025-02-21 ENCOUNTER — OFFICE VISIT (OUTPATIENT)
Dept: NEUROLOGY | Age: 60
End: 2025-02-21
Payer: MEDICAID

## 2025-02-21 VITALS
HEART RATE: 67 BPM | SYSTOLIC BLOOD PRESSURE: 132 MMHG | OXYGEN SATURATION: 95 % | DIASTOLIC BLOOD PRESSURE: 72 MMHG | BODY MASS INDEX: 48.58 KG/M2 | HEIGHT: 63 IN | WEIGHT: 274.2 LBS

## 2025-02-21 DIAGNOSIS — G44.89 OTHER HEADACHE SYNDROME: ICD-10-CM

## 2025-02-21 DIAGNOSIS — G44.89 OTHER HEADACHE SYNDROME: Primary | ICD-10-CM

## 2025-02-21 LAB
ALBUMIN SERPL BCG-MCNC: 4.1 G/DL (ref 3.5–5.1)
ALP SERPL-CCNC: 98 U/L (ref 38–126)
ALT SERPL W/O P-5'-P-CCNC: 34 U/L (ref 11–66)
ANION GAP SERPL CALC-SCNC: 15 MEQ/L (ref 8–16)
AST SERPL-CCNC: 40 U/L (ref 5–40)
BASOPHILS ABSOLUTE: 0.1 THOU/MM3 (ref 0–0.1)
BASOPHILS NFR BLD AUTO: 1.1 %
BILIRUB SERPL-MCNC: 0.4 MG/DL (ref 0.3–1.2)
BUN SERPL-MCNC: 18 MG/DL (ref 7–22)
CALCIUM SERPL-MCNC: 9.3 MG/DL (ref 8.2–9.6)
CHLORIDE SERPL-SCNC: 102 MEQ/L (ref 98–111)
CO2 SERPL-SCNC: 25 MEQ/L (ref 23–33)
CREAT SERPL-MCNC: 0.7 MG/DL (ref 0.4–1.2)
DEPRECATED RDW RBC AUTO: 41.4 FL (ref 35–45)
EOSINOPHIL NFR BLD AUTO: 1.8 %
EOSINOPHILS ABSOLUTE: 0.1 THOU/MM3 (ref 0–0.4)
ERYTHROCYTE [DISTWIDTH] IN BLOOD BY AUTOMATED COUNT: 11.6 % (ref 11.5–14.5)
GFR SERPL CREATININE-BSD FRML MDRD: > 90 ML/MIN/1.73M2
GLUCOSE SERPL-MCNC: 144 MG/DL (ref 70–108)
HCT VFR BLD AUTO: 43.7 % (ref 37–47)
HGB BLD-MCNC: 14.5 GM/DL (ref 12–16)
IMM GRANULOCYTES # BLD AUTO: 0.03 THOU/MM3 (ref 0–0.07)
IMM GRANULOCYTES NFR BLD AUTO: 0.4 %
LYMPHOCYTES ABSOLUTE: 3.1 THOU/MM3 (ref 1–4.8)
LYMPHOCYTES NFR BLD AUTO: 37.2 %
MCH RBC QN AUTO: 32.2 PG (ref 26–33)
MCHC RBC AUTO-ENTMCNC: 33.2 GM/DL (ref 32.2–35.5)
MCV RBC AUTO: 97.1 FL (ref 81–99)
MONOCYTES ABSOLUTE: 0.5 THOU/MM3 (ref 0.4–1.3)
MONOCYTES NFR BLD AUTO: 6.4 %
NEUTROPHILS ABSOLUTE: 4.4 THOU/MM3 (ref 1.8–7.7)
NEUTROPHILS NFR BLD AUTO: 53.1 %
NRBC BLD AUTO-RTO: 0 /100 WBC
PLATELET # BLD AUTO: 273 THOU/MM3 (ref 130–400)
PMV BLD AUTO: 9.7 FL (ref 9.4–12.4)
POTASSIUM SERPL-SCNC: 4.6 MEQ/L (ref 3.5–5.2)
PROT SERPL-MCNC: 7.7 G/DL (ref 6.1–8)
RBC # BLD AUTO: 4.5 MILL/MM3 (ref 4.2–5.4)
SODIUM SERPL-SCNC: 142 MEQ/L (ref 135–145)
WBC # BLD AUTO: 8.2 THOU/MM3 (ref 4.8–10.8)

## 2025-02-21 PROCEDURE — 99214 OFFICE O/P EST MOD 30 MIN: CPT | Performed by: NURSE PRACTITIONER

## 2025-02-21 RX ORDER — TOPIRAMATE 50 MG/1
50 TABLET, FILM COATED ORAL 2 TIMES DAILY
Qty: 60 TABLET | Refills: 3 | Status: SHIPPED | OUTPATIENT
Start: 2025-02-21

## 2025-02-21 NOTE — PROGRESS NOTES
NEUROLOGY OUT PATIENT FOLLOW UP NOTE:  2/21/202512:21 PM    Mami Lobato is here  follow up for headache           Allergies   Allergen Reactions    Tape [Adhesive Tape] Dermatitis       Current Outpatient Medications:     lamoTRIgine (LAMICTAL) 150 MG tablet, Take 1 tablet by mouth daily, Disp: 30 tablet, Rfl: 5    VICTOZA 18 MG/3ML SOPN SC injection, INJECT 0.6MG UNDER THE SKIN ONCE DAILY FOR 1 WEEK THEN INCREASE TO 1.2MG UNDER THE SKIN ONCE DAILY, Disp: , Rfl:     topiramate (TOPAMAX) 50 MG tablet, Topamax 25 mg tablet daily for one week then 50 mg daily there after. Take with plenty of fluids., Disp: 30 tablet, Rfl: 3    albuterol sulfate HFA (PROVENTIL;VENTOLIN;PROAIR) 108 (90 Base) MCG/ACT inhaler, 2 puffs every 6 hours as needed for Wheezing, Disp: , Rfl:     SYMBICORT 80-4.5 MCG/ACT AERO, Inhale 2 puffs into the lungs in the morning and 2 puffs in the evening., Disp: , Rfl:     GAVILYTE-G 236 g solution, USE AS DIRECTED FOR COLONOSCOPY PREP, Disp: , Rfl:     pantoprazole (PROTONIX) 40 MG tablet, Take 1 tablet by mouth daily, Disp: , Rfl:     nystatin (MYCOSTATIN) 938625 UNIT/GM cream, APPLY TOPICALLY IN GROIN AND ABDOMINAL FOLDS TWICE DAILY FOR 7 DAYS, Disp: , Rfl:     nystatin (MYCOSTATIN) 295530 UNIT/GM ointment, Apply 1 Application topically 3 times daily as needed, Disp: , Rfl:     naproxen (NAPROSYN) 250 MG tablet, Take 2 tablets by mouth every 8 hours as needed, Disp: , Rfl:     busPIRone (BUSPAR) 5 MG tablet, Take 3 tablets by mouth 2 times daily, Disp: , Rfl:     pioglitazone (ACTOS) 45 MG tablet, Take 1 tablet by mouth daily, Disp: , Rfl:     escitalopram (LEXAPRO) 10 MG tablet, 1 tablet, Disp: , Rfl:     atorvastatin (LIPITOR) 10 MG tablet, Take 1 tablet by mouth daily, Disp: , Rfl:     Meclizine HCl (ANTIVERT PO), Take by mouth, Disp: , Rfl:     enalapril (VASOTEC) 5 MG tablet, Take 1 tablet by mouth daily, Disp: , Rfl:     I reviewed the past medical history, allergies, medications,

## 2025-02-21 NOTE — PATIENT INSTRUCTIONS
Change Topamax  50 mg two times daily there after. Take with plenty of fluids.   Continue with Lamictal 150 mg daily.  Refills given  CBC, CMP  Please use the CPAP.    Keep headache diary  Call with any new symptoms or concerns.   Follow up in 4 months with Dr. Young.    Call if any questions or concern

## 2025-03-24 ENCOUNTER — OFFICE VISIT (OUTPATIENT)
Dept: NEUROLOGY | Age: 60
End: 2025-03-24
Payer: MEDICAID

## 2025-03-24 VITALS
HEIGHT: 63 IN | HEART RATE: 70 BPM | BODY MASS INDEX: 48.2 KG/M2 | DIASTOLIC BLOOD PRESSURE: 70 MMHG | SYSTOLIC BLOOD PRESSURE: 110 MMHG | WEIGHT: 272 LBS | OXYGEN SATURATION: 95 %

## 2025-03-24 DIAGNOSIS — G25.2 RESTING TREMOR: ICD-10-CM

## 2025-03-24 DIAGNOSIS — G44.89 OTHER HEADACHE SYNDROME: Primary | ICD-10-CM

## 2025-03-24 PROCEDURE — 99214 OFFICE O/P EST MOD 30 MIN: CPT | Performed by: PSYCHIATRY & NEUROLOGY

## 2025-03-24 RX ORDER — DULAGLUTIDE 0.75 MG/.5ML
0.75 INJECTION, SOLUTION SUBCUTANEOUS WEEKLY
COMMUNITY

## 2025-03-24 RX ORDER — TOPIRAMATE 50 MG/1
50 TABLET, FILM COATED ORAL 3 TIMES DAILY
Qty: 90 TABLET | Refills: 4 | Status: SHIPPED | OUTPATIENT
Start: 2025-03-24

## 2025-03-24 NOTE — PROGRESS NOTES
NEUROLOGY OUT PATIENT FOLLOW UP NOTE:  3/24/44730:00 PM    Mami Lobato is here  follow up for headache.          Allergies   Allergen Reactions    Tape [Adhesive Tape] Dermatitis       Current Outpatient Medications:     Insulin Glargine (TOUJEO MAX SOLOSTAR SC), Inject into the skin, Disp: , Rfl:     dulaglutide (TRULICITY) 0.75 MG/0.5ML SOAJ SC injection, Inject 0.5 mLs into the skin once a week, Disp: , Rfl:     insulin lispro protamine & lispro (HUMALOG MIX) (75-25) 100 UNIT per ML SUSP injection vial, Inject into the skin 2 times daily (with meals), Disp: , Rfl:     topiramate (TOPAMAX) 50 MG tablet, Take 1 tablet by mouth 2 times daily, Disp: 60 tablet, Rfl: 3    lamoTRIgine (LAMICTAL) 150 MG tablet, Take 1 tablet by mouth daily, Disp: 30 tablet, Rfl: 5    VICTOZA 18 MG/3ML SOPN SC injection, INJECT 0.6MG UNDER THE SKIN ONCE DAILY FOR 1 WEEK THEN INCREASE TO 1.2MG UNDER THE SKIN ONCE DAILY, Disp: , Rfl:     albuterol sulfate HFA (PROVENTIL;VENTOLIN;PROAIR) 108 (90 Base) MCG/ACT inhaler, 2 puffs every 6 hours as needed for Wheezing, Disp: , Rfl:     SYMBICORT 80-4.5 MCG/ACT AERO, Inhale 2 puffs into the lungs in the morning and 2 puffs in the evening., Disp: , Rfl:     pantoprazole (PROTONIX) 40 MG tablet, Take 1 tablet by mouth daily, Disp: , Rfl:     nystatin (MYCOSTATIN) 411400 UNIT/GM cream, APPLY TOPICALLY IN GROIN AND ABDOMINAL FOLDS TWICE DAILY FOR 7 DAYS, Disp: , Rfl:     nystatin (MYCOSTATIN) 580869 UNIT/GM ointment, Apply 1 Application topically 3 times daily as needed, Disp: , Rfl:     naproxen (NAPROSYN) 250 MG tablet, Take 2 tablets by mouth every 8 hours as needed, Disp: , Rfl:     busPIRone (BUSPAR) 5 MG tablet, Take 3 tablets by mouth 2 times daily, Disp: , Rfl:     pioglitazone (ACTOS) 45 MG tablet, Take 1 tablet by mouth daily, Disp: , Rfl:     escitalopram (LEXAPRO) 10 MG tablet, 1 tablet, Disp: , Rfl:     atorvastatin (LIPITOR) 10 MG tablet, Take 1 tablet by mouth daily, Disp: ,

## 2025-03-24 NOTE — PATIENT INSTRUCTIONS
Change Topamax to 50 mg three times a day. Take with plenty of fluids.   Continue with Lamictal 150 mg daily.  Refills given  Please use the CPAP.    Call with any new symptoms or concerns.   Follow up in 4 months.  Call if any questions or concern

## 2025-04-21 ENCOUNTER — HOSPITAL ENCOUNTER (OUTPATIENT)
Age: 60
Discharge: HOME | End: 2025-04-21
Payer: MEDICAID

## 2025-04-21 LAB — TSH SERPL DL<=0.05 MIU/L-ACNC: 2.27 UIU/ML (ref 0.27–4.2)

## 2025-04-21 PROCEDURE — 84443 ASSAY THYROID STIM HORMONE: CPT

## 2025-04-21 PROCEDURE — 36415 COLL VENOUS BLD VENIPUNCTURE: CPT

## 2025-06-23 ENCOUNTER — OFFICE VISIT (OUTPATIENT)
Dept: NEUROLOGY | Age: 60
End: 2025-06-23
Payer: MEDICAID

## 2025-06-23 VITALS
WEIGHT: 262.38 LBS | HEART RATE: 81 BPM | HEIGHT: 63 IN | BODY MASS INDEX: 46.49 KG/M2 | DIASTOLIC BLOOD PRESSURE: 66 MMHG | SYSTOLIC BLOOD PRESSURE: 128 MMHG

## 2025-06-23 DIAGNOSIS — G44.89 OTHER HEADACHE SYNDROME: Primary | ICD-10-CM

## 2025-06-23 DIAGNOSIS — G25.2 RESTING TREMOR: ICD-10-CM

## 2025-06-23 PROCEDURE — 99213 OFFICE O/P EST LOW 20 MIN: CPT | Performed by: PSYCHIATRY & NEUROLOGY

## 2025-06-23 RX ORDER — TOPIRAMATE 50 MG/1
50 TABLET, FILM COATED ORAL 3 TIMES DAILY
Qty: 90 TABLET | Refills: 5 | Status: SHIPPED | OUTPATIENT
Start: 2025-06-23

## 2025-06-23 RX ORDER — LAMOTRIGINE 150 MG/1
150 TABLET ORAL DAILY
Qty: 30 TABLET | Refills: 5 | Status: SHIPPED | OUTPATIENT
Start: 2025-06-23

## 2025-06-23 RX ORDER — LOSARTAN POTASSIUM 25 MG/1
25 TABLET ORAL DAILY
COMMUNITY
Start: 2025-05-18

## 2025-06-23 NOTE — PROGRESS NOTES
Disp: , Rfl:     nystatin (MYCOSTATIN) 672365 UNIT/GM ointment, Apply 1 Application topically 3 times daily as needed (Patient not taking: Reported on 6/23/2025), Disp: , Rfl:     naproxen (NAPROSYN) 250 MG tablet, Take 2 tablets by mouth every 8 hours as needed, Disp: , Rfl:     pioglitazone (ACTOS) 45 MG tablet, Take 1 tablet by mouth daily (Patient not taking: Reported on 6/23/2025), Disp: , Rfl:     I reviewed the past medical history, allergies, medications, social history and family history.       PE:   Vitals:    06/23/25 1110   BP: 128/66   BP Site: Right Upper Arm   Patient Position: Sitting   BP Cuff Size: Large Adult   Pulse: 81   Weight: 119 kg (262 lb 6 oz)   Height: 1.6 m (5' 3\")        General Appearance:  alert and cooperative  Gen: NAD, Language is Intact. Skin: no rash, lesion, dry  to touch. warm  Head: no rash, no icterus  Neck:  The Neck is supple.   Neuro: CN 2-12 grossly intact with no focal deficits. Power 5/5 Throughout symmetric, Reflexes are symmetric. Long tracts are intact. Cerebellar exam is Intact. Sensory exam is intact to light touch.  Gait is guarded. Mild right hand resting tremor.  Her blink rate is intact. No hypophonia.   Musculoskeletal:  Has no hand arthritis, no limitation of ROM in any of the four extremities   Lower extremities no  edema          DATA:         Results for orders placed or performed during the hospital encounter of 04/21/25   TSH reflex to FT4   Result Value Ref Range    TSH 2.27 0.27 - 4.20 uIU/mL                    Assessment:     Diagnosis Orders   1. Other headache syndrome        2. Resting tremor             Follow up for headache. she report the headache is well controlled. No side effects. She had one headache one week ago. She has variable sleep. She is pleased with how she is doing. She is still not interested in the CPAP. Exam is non focal. she is on Topamax 50 mg twice a day it helps, can wake up with the headache, wakes up tired from sleep, she

## 2025-06-23 NOTE — PATIENT INSTRUCTIONS
Continue Topamax 50 mg three times a day. Take with plenty of fluids.   Continue with Lamictal 150 mg daily.  Refills given  Call with any new symptoms or concerns.   Follow up in 7 months.  Call if any questions or concern

## 2025-08-04 ENCOUNTER — TELEPHONE (OUTPATIENT)
Dept: CARDIOLOGY CLINIC | Age: 60
End: 2025-08-04

## 2025-08-22 ENCOUNTER — HOSPITAL ENCOUNTER (EMERGENCY)
Age: 60
Discharge: HOME | End: 2025-08-22
Attending: FAMILY MEDICINE
Payer: MEDICAID

## 2025-08-22 VITALS
WEIGHT: 255 LBS | RESPIRATION RATE: 16 BRPM | DIASTOLIC BLOOD PRESSURE: 44 MMHG | HEART RATE: 74 BPM | SYSTOLIC BLOOD PRESSURE: 127 MMHG | TEMPERATURE: 98 F | HEIGHT: 63 IN | OXYGEN SATURATION: 98 % | BODY MASS INDEX: 45.18 KG/M2

## 2025-08-22 DIAGNOSIS — S80.12XA CONTUSION OF LEFT LOWER LEG, INITIAL ENCOUNTER: Primary | ICD-10-CM

## 2025-08-22 PROCEDURE — 99282 EMERGENCY DEPT VISIT SF MDM: CPT

## 2025-08-22 ASSESSMENT — PAIN DESCRIPTION - ORIENTATION: ORIENTATION: LEFT

## 2025-08-22 ASSESSMENT — PAIN SCALES - GENERAL: PAINLEVEL_OUTOF10: 7

## 2025-08-22 ASSESSMENT — PAIN - FUNCTIONAL ASSESSMENT: PAIN_FUNCTIONAL_ASSESSMENT: 0-10

## 2025-08-22 ASSESSMENT — ENCOUNTER SYMPTOMS
NAUSEA: 0
VOMITING: 0

## 2025-08-22 ASSESSMENT — PAIN DESCRIPTION - LOCATION: LOCATION: LEG
